# Patient Record
Sex: FEMALE | Race: BLACK OR AFRICAN AMERICAN | Employment: UNEMPLOYED | URBAN - METROPOLITAN AREA
[De-identification: names, ages, dates, MRNs, and addresses within clinical notes are randomized per-mention and may not be internally consistent; named-entity substitution may affect disease eponyms.]

---

## 2023-03-18 ENCOUNTER — HOSPITAL ENCOUNTER (EMERGENCY)
Age: 43
Discharge: HOME | End: 2023-03-18
Attending: EMERGENCY MEDICINE
Payer: MEDICAID

## 2023-03-18 ENCOUNTER — APPOINTMENT (OUTPATIENT)
Dept: CT IMAGING | Age: 43
End: 2023-03-18
Attending: EMERGENCY MEDICINE
Payer: MEDICAID

## 2023-03-18 VITALS
OXYGEN SATURATION: 100 % | SYSTOLIC BLOOD PRESSURE: 148 MMHG | RESPIRATION RATE: 18 BRPM | BODY MASS INDEX: 25.88 KG/M2 | HEIGHT: 66 IN | HEART RATE: 73 BPM | WEIGHT: 161 LBS | DIASTOLIC BLOOD PRESSURE: 91 MMHG | TEMPERATURE: 98.3 F

## 2023-03-18 DIAGNOSIS — R10.9 ACUTE ABDOMINAL PAIN: Primary | ICD-10-CM

## 2023-03-18 DIAGNOSIS — R73.9 HYPERGLYCEMIA: ICD-10-CM

## 2023-03-18 DIAGNOSIS — M54.50 LUMBAR PAIN: ICD-10-CM

## 2023-03-18 LAB
ALBUMIN SERPL-MCNC: 3.6 G/DL (ref 3.5–5)
ALBUMIN/GLOB SERPL: 0.9 (ref 1.1–2.2)
ALP SERPL-CCNC: 65 U/L (ref 45–117)
ALT SERPL-CCNC: 15 U/L (ref 12–78)
ANION GAP SERPL CALC-SCNC: 9 MMOL/L (ref 5–15)
APPEARANCE UR: CLEAR
AST SERPL-CCNC: 11 U/L (ref 15–37)
BACTERIA URNS QL MICRO: NEGATIVE /HPF
BASOPHILS # BLD: 0.1 K/UL (ref 0–0.1)
BASOPHILS NFR BLD: 1 % (ref 0–1)
BILIRUB SERPL-MCNC: 0.3 MG/DL (ref 0.2–1)
BILIRUB UR QL: NEGATIVE
BUN SERPL-MCNC: 8 MG/DL (ref 6–20)
BUN/CREAT SERPL: 12 (ref 12–20)
CALCIUM SERPL-MCNC: 8.4 MG/DL (ref 8.5–10.1)
CHLORIDE SERPL-SCNC: 101 MMOL/L (ref 97–108)
CO2 SERPL-SCNC: 27 MMOL/L (ref 21–32)
COLOR UR: ABNORMAL
CREAT SERPL-MCNC: 0.69 MG/DL (ref 0.55–1.02)
DIFFERENTIAL METHOD BLD: ABNORMAL
EOSINOPHIL # BLD: 0.2 K/UL (ref 0–0.4)
EOSINOPHIL NFR BLD: 3 % (ref 0–7)
EPITH CASTS URNS QL MICRO: ABNORMAL /LPF
ERYTHROCYTE [DISTWIDTH] IN BLOOD BY AUTOMATED COUNT: 18.7 % (ref 11.5–14.5)
GLOBULIN SER CALC-MCNC: 3.9 G/DL (ref 2–4)
GLUCOSE SERPL-MCNC: 295 MG/DL (ref 65–100)
GLUCOSE UR STRIP.AUTO-MCNC: >1000 MG/DL
HCG UR QL: NEGATIVE
HCT VFR BLD AUTO: 31.4 % (ref 35–47)
HGB BLD-MCNC: 8.7 G/DL (ref 11.5–16)
HGB UR QL STRIP: NEGATIVE
IMM GRANULOCYTES # BLD AUTO: 0 K/UL (ref 0–0.04)
IMM GRANULOCYTES NFR BLD AUTO: 0 % (ref 0–0.5)
KETONES UR QL STRIP.AUTO: NEGATIVE MG/DL
LEUKOCYTE ESTERASE UR QL STRIP.AUTO: NEGATIVE
LIPASE SERPL-CCNC: 481 U/L (ref 73–393)
LYMPHOCYTES # BLD: 1.7 K/UL (ref 0.8–3.5)
LYMPHOCYTES NFR BLD: 26 % (ref 12–49)
MCH RBC QN AUTO: 18.1 PG (ref 26–34)
MCHC RBC AUTO-ENTMCNC: 27.7 G/DL (ref 30–36.5)
MCV RBC AUTO: 65.3 FL (ref 80–99)
MONOCYTES # BLD: 0.4 K/UL (ref 0–1)
MONOCYTES NFR BLD: 6 % (ref 5–13)
NEUTS SEG # BLD: 4.1 K/UL (ref 1.8–8)
NEUTS SEG NFR BLD: 64 % (ref 32–75)
NITRITE UR QL STRIP.AUTO: NEGATIVE
NRBC # BLD: 0 K/UL (ref 0–0.01)
NRBC BLD-RTO: 0 PER 100 WBC
PH UR STRIP: 6 (ref 5–8)
PLATELET # BLD AUTO: 357 K/UL (ref 150–400)
PMV BLD AUTO: 9.8 FL (ref 8.9–12.9)
POTASSIUM SERPL-SCNC: 4.3 MMOL/L (ref 3.5–5.1)
PROT SERPL-MCNC: 7.5 G/DL (ref 6.4–8.2)
PROT UR STRIP-MCNC: NEGATIVE MG/DL
RBC # BLD AUTO: 4.81 M/UL (ref 3.8–5.2)
RBC #/AREA URNS HPF: ABNORMAL /HPF (ref 0–5)
RBC MORPH BLD: ABNORMAL
RBC MORPH BLD: ABNORMAL
SODIUM SERPL-SCNC: 137 MMOL/L (ref 136–145)
SP GR UR REFRACTOMETRY: 1.01 (ref 1–1.03)
UA: UC IF INDICATED,UAUC: ABNORMAL
UROBILINOGEN UR QL STRIP.AUTO: 0.2 EU/DL (ref 0.2–1)
WBC # BLD AUTO: 6.5 K/UL (ref 3.6–11)
WBC URNS QL MICRO: ABNORMAL /HPF (ref 0–4)

## 2023-03-18 PROCEDURE — 83690 ASSAY OF LIPASE: CPT

## 2023-03-18 PROCEDURE — 36415 COLL VENOUS BLD VENIPUNCTURE: CPT

## 2023-03-18 PROCEDURE — 74011250636 HC RX REV CODE- 250/636: Performed by: EMERGENCY MEDICINE

## 2023-03-18 PROCEDURE — 80053 COMPREHEN METABOLIC PANEL: CPT

## 2023-03-18 PROCEDURE — 81001 URINALYSIS AUTO W/SCOPE: CPT

## 2023-03-18 PROCEDURE — 96374 THER/PROPH/DIAG INJ IV PUSH: CPT

## 2023-03-18 PROCEDURE — 74177 CT ABD & PELVIS W/CONTRAST: CPT

## 2023-03-18 PROCEDURE — 74011000636 HC RX REV CODE- 636: Performed by: EMERGENCY MEDICINE

## 2023-03-18 PROCEDURE — 96375 TX/PRO/DX INJ NEW DRUG ADDON: CPT

## 2023-03-18 PROCEDURE — 81025 URINE PREGNANCY TEST: CPT

## 2023-03-18 PROCEDURE — 85025 COMPLETE CBC W/AUTO DIFF WBC: CPT

## 2023-03-18 PROCEDURE — 99285 EMERGENCY DEPT VISIT HI MDM: CPT

## 2023-03-18 RX ORDER — TRAMADOL HYDROCHLORIDE 50 MG/1
50 TABLET ORAL
Qty: 10 TABLET | Refills: 0 | Status: SHIPPED | OUTPATIENT
Start: 2023-03-18 | End: 2023-03-21

## 2023-03-18 RX ORDER — MORPHINE SULFATE 2 MG/ML
2 INJECTION, SOLUTION INTRAMUSCULAR; INTRAVENOUS
Status: COMPLETED | OUTPATIENT
Start: 2023-03-18 | End: 2023-03-18

## 2023-03-18 RX ORDER — SODIUM CHLORIDE 0.9 % (FLUSH) 0.9 %
5-40 SYRINGE (ML) INJECTION AS NEEDED
Status: DISCONTINUED | OUTPATIENT
Start: 2023-03-18 | End: 2023-03-18 | Stop reason: HOSPADM

## 2023-03-18 RX ORDER — SODIUM CHLORIDE 0.9 % (FLUSH) 0.9 %
5-40 SYRINGE (ML) INJECTION EVERY 8 HOURS
Status: DISCONTINUED | OUTPATIENT
Start: 2023-03-18 | End: 2023-03-18 | Stop reason: HOSPADM

## 2023-03-18 RX ORDER — ONDANSETRON 4 MG/1
4 TABLET, ORALLY DISINTEGRATING ORAL
Qty: 10 TABLET | Refills: 0 | Status: SHIPPED | OUTPATIENT
Start: 2023-03-18

## 2023-03-18 RX ORDER — METHOCARBAMOL 750 MG/1
750 TABLET, FILM COATED ORAL 4 TIMES DAILY
Qty: 20 TABLET | Refills: 0 | Status: SHIPPED | OUTPATIENT
Start: 2023-03-18

## 2023-03-18 RX ORDER — KETOROLAC TROMETHAMINE 30 MG/ML
30 INJECTION, SOLUTION INTRAMUSCULAR; INTRAVENOUS
Status: COMPLETED | OUTPATIENT
Start: 2023-03-18 | End: 2023-03-18

## 2023-03-18 RX ORDER — ONDANSETRON 2 MG/ML
4 INJECTION INTRAMUSCULAR; INTRAVENOUS
Status: COMPLETED | OUTPATIENT
Start: 2023-03-18 | End: 2023-03-18

## 2023-03-18 RX ADMIN — IOPAMIDOL 100 ML: 755 INJECTION, SOLUTION INTRAVENOUS at 09:28

## 2023-03-18 RX ADMIN — MORPHINE SULFATE 2 MG: 2 INJECTION, SOLUTION INTRAMUSCULAR; INTRAVENOUS at 08:54

## 2023-03-18 RX ADMIN — KETOROLAC TROMETHAMINE 30 MG: 30 INJECTION, SOLUTION INTRAMUSCULAR at 07:58

## 2023-03-18 RX ADMIN — ONDANSETRON 4 MG: 2 INJECTION INTRAMUSCULAR; INTRAVENOUS at 07:58
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
## 2023-03-18 NOTE — ED TRIAGE NOTES
Patient presents to ED with c/o abdominal and back pain that began two days ago. Patient is alert and oriented x 4 and in no acute distress at this time. Respirations are at a regular rate, depth, and pattern. Patient updated on plan of care and has no questions or concerns at this time. Call bell within reach. Will continue to monitor. Please reference nursing assessment. Emergency Department Nursing Plan of Care       The Nursing Plan of Care is developed from the Nursing assessment and Emergency Department Attending provider initial evaluation. The plan of care may be reviewed in the ED Provider note.     The Plan of Care was developed with the following considerations:   Patient / Family readiness to learn indicated by:verbalized understanding and successful return demonstration  Persons(s) to be included in education: patient  Barriers to Learning/Limitations:No    Signed     Aramis Horvath RN    3/18/2023   7:17 AM

## 2023-03-18 NOTE — Clinical Note
[facility] EMERGENCY DEPT  [address] [facility] [id number] [phone]    Work/School Note    Date: 3/18/2023    To Whom It May concern:    Joellen Willis was seen and treated today in the emergency room by the following provider(s):  Attending Provider: Tracey Vargas MD.      Joellen Willis is excused from work/school on 03/18/23 and 03/19/23. She is medically clear to return to work/school on 3/20/2023.        Sincerely,          Army Yamil MD

## 2023-05-22 ENCOUNTER — HOSPITAL ENCOUNTER (EMERGENCY)
Facility: HOSPITAL | Age: 43
Discharge: HOME | End: 2023-05-22
Payer: MEDICAID

## 2023-05-22 ENCOUNTER — APPOINTMENT (OUTPATIENT)
Facility: HOSPITAL | Age: 43
End: 2023-05-22
Payer: MEDICAID

## 2023-05-22 VITALS
SYSTOLIC BLOOD PRESSURE: 170 MMHG | TEMPERATURE: 98.7 F | OXYGEN SATURATION: 100 % | BODY MASS INDEX: 26.76 KG/M2 | HEIGHT: 66 IN | RESPIRATION RATE: 16 BRPM | HEART RATE: 68 BPM | WEIGHT: 166.5 LBS | DIASTOLIC BLOOD PRESSURE: 79 MMHG

## 2023-05-22 DIAGNOSIS — D64.9 ANEMIA, UNSPECIFIED TYPE: ICD-10-CM

## 2023-05-22 DIAGNOSIS — I10 ESSENTIAL HYPERTENSION: ICD-10-CM

## 2023-05-22 DIAGNOSIS — E11.65 TYPE 2 DIABETES MELLITUS WITH HYPERGLYCEMIA, UNSPECIFIED WHETHER LONG TERM INSULIN USE (HCC): ICD-10-CM

## 2023-05-22 DIAGNOSIS — E86.0 DEHYDRATION: Primary | ICD-10-CM

## 2023-05-22 DIAGNOSIS — R10.9 ABDOMINAL PAIN, UNSPECIFIED ABDOMINAL LOCATION: ICD-10-CM

## 2023-05-22 DIAGNOSIS — M54.50 BILATERAL LOW BACK PAIN WITHOUT SCIATICA, UNSPECIFIED CHRONICITY: ICD-10-CM

## 2023-05-22 LAB
ALBUMIN SERPL-MCNC: 3.7 G/DL (ref 3.5–5)
ALBUMIN/GLOB SERPL: 1 (ref 1.1–2.2)
ALP SERPL-CCNC: 58 U/L (ref 45–117)
ALT SERPL-CCNC: 13 U/L (ref 12–78)
ANION GAP SERPL CALC-SCNC: 10 MMOL/L (ref 5–15)
APPEARANCE UR: CLEAR
AST SERPL-CCNC: 9 U/L (ref 15–37)
BACTERIA URNS QL MICRO: NEGATIVE /HPF
BASOPHILS # BLD: 0.1 K/UL (ref 0–0.1)
BASOPHILS NFR BLD: 1 % (ref 0–1)
BILIRUB SERPL-MCNC: 0.6 MG/DL (ref 0.2–1)
BILIRUB UR QL: NEGATIVE
BUN SERPL-MCNC: 8 MG/DL (ref 6–20)
BUN/CREAT SERPL: 11 (ref 12–20)
CALCIUM SERPL-MCNC: 8.9 MG/DL (ref 8.5–10.1)
CHLORIDE SERPL-SCNC: 99 MMOL/L (ref 97–108)
CO2 SERPL-SCNC: 27 MMOL/L (ref 21–32)
COLOR UR: ABNORMAL
CREAT SERPL-MCNC: 0.72 MG/DL (ref 0.55–1.02)
DIFFERENTIAL METHOD BLD: ABNORMAL
EOSINOPHIL # BLD: 0.1 K/UL (ref 0–0.4)
EOSINOPHIL NFR BLD: 2 % (ref 0–7)
EPITH CASTS URNS QL MICRO: ABNORMAL /LPF
ERYTHROCYTE [DISTWIDTH] IN BLOOD BY AUTOMATED COUNT: 19.9 % (ref 11.5–14.5)
GLOBULIN SER CALC-MCNC: 3.8 G/DL (ref 2–4)
GLUCOSE SERPL-MCNC: 197 MG/DL (ref 65–100)
GLUCOSE UR STRIP.AUTO-MCNC: 100 MG/DL
HCG UR QL: NEGATIVE
HCT VFR BLD AUTO: 32.8 % (ref 35–47)
HGB BLD-MCNC: 9.1 G/DL (ref 11.5–16)
HGB UR QL STRIP: NEGATIVE
IMM GRANULOCYTES # BLD AUTO: 0 K/UL (ref 0–0.04)
IMM GRANULOCYTES NFR BLD AUTO: 0 % (ref 0–0.5)
KETONES UR QL STRIP.AUTO: 40 MG/DL
LEUKOCYTE ESTERASE UR QL STRIP.AUTO: NEGATIVE
LIPASE SERPL-CCNC: 232 U/L (ref 73–393)
LYMPHOCYTES # BLD: 1.8 K/UL (ref 0.8–3.5)
LYMPHOCYTES NFR BLD: 27 % (ref 12–49)
MCH RBC QN AUTO: 18.1 PG (ref 26–34)
MCHC RBC AUTO-ENTMCNC: 27.7 G/DL (ref 30–36.5)
MCV RBC AUTO: 65.1 FL (ref 80–99)
MONOCYTES # BLD: 0.5 K/UL (ref 0–1)
MONOCYTES NFR BLD: 7 % (ref 5–13)
NEUTS SEG # BLD: 4 K/UL (ref 1.8–8)
NEUTS SEG NFR BLD: 63 % (ref 32–75)
NITRITE UR QL STRIP.AUTO: NEGATIVE
NRBC # BLD: 0 K/UL (ref 0–0.01)
NRBC BLD-RTO: 0 PER 100 WBC
PH UR STRIP: 5.5 (ref 5–8)
PLATELET # BLD AUTO: 242 K/UL (ref 150–400)
POTASSIUM SERPL-SCNC: 3.9 MMOL/L (ref 3.5–5.1)
PROT SERPL-MCNC: 7.5 G/DL (ref 6.4–8.2)
PROT UR STRIP-MCNC: ABNORMAL MG/DL
RBC # BLD AUTO: 5.04 M/UL (ref 3.8–5.2)
RBC #/AREA URNS HPF: ABNORMAL /HPF (ref 0–5)
RBC MORPH BLD: ABNORMAL
SODIUM SERPL-SCNC: 136 MMOL/L (ref 136–145)
SP GR UR REFRACTOMETRY: 1.02
UROBILINOGEN UR QL STRIP.AUTO: 0.2 EU/DL (ref 0.2–1)
WBC # BLD AUTO: 6.5 K/UL (ref 3.6–11)
WBC URNS QL MICRO: ABNORMAL /HPF (ref 0–4)

## 2023-05-22 PROCEDURE — 81001 URINALYSIS AUTO W/SCOPE: CPT

## 2023-05-22 PROCEDURE — 80053 COMPREHEN METABOLIC PANEL: CPT

## 2023-05-22 PROCEDURE — 99285 EMERGENCY DEPT VISIT HI MDM: CPT

## 2023-05-22 PROCEDURE — 74177 CT ABD & PELVIS W/CONTRAST: CPT

## 2023-05-22 PROCEDURE — 81025 URINE PREGNANCY TEST: CPT

## 2023-05-22 PROCEDURE — 36415 COLL VENOUS BLD VENIPUNCTURE: CPT

## 2023-05-22 PROCEDURE — 85025 COMPLETE CBC W/AUTO DIFF WBC: CPT

## 2023-05-22 PROCEDURE — 96375 TX/PRO/DX INJ NEW DRUG ADDON: CPT

## 2023-05-22 PROCEDURE — 6360000002 HC RX W HCPCS: Performed by: PHYSICIAN ASSISTANT

## 2023-05-22 PROCEDURE — 6360000004 HC RX CONTRAST MEDICATION: Performed by: PHYSICIAN ASSISTANT

## 2023-05-22 PROCEDURE — 6370000000 HC RX 637 (ALT 250 FOR IP): Performed by: PHYSICIAN ASSISTANT

## 2023-05-22 PROCEDURE — 83690 ASSAY OF LIPASE: CPT

## 2023-05-22 PROCEDURE — 96374 THER/PROPH/DIAG INJ IV PUSH: CPT

## 2023-05-22 RX ORDER — ONDANSETRON 2 MG/ML
4 INJECTION INTRAMUSCULAR; INTRAVENOUS ONCE
Status: COMPLETED | OUTPATIENT
Start: 2023-05-22 | End: 2023-05-22

## 2023-05-22 RX ORDER — KETOROLAC TROMETHAMINE 30 MG/ML
15 INJECTION, SOLUTION INTRAMUSCULAR; INTRAVENOUS ONCE
Status: COMPLETED | OUTPATIENT
Start: 2023-05-22 | End: 2023-05-22

## 2023-05-22 RX ORDER — CYCLOBENZAPRINE HCL 10 MG
10 TABLET ORAL 3 TIMES DAILY PRN
Status: DISCONTINUED | OUTPATIENT
Start: 2023-05-22 | End: 2023-05-22 | Stop reason: HOSPADM

## 2023-05-22 RX ORDER — CYCLOBENZAPRINE HCL 10 MG
10 TABLET ORAL 3 TIMES DAILY PRN
Qty: 21 TABLET | Refills: 0 | Status: SHIPPED | OUTPATIENT
Start: 2023-05-22 | End: 2023-05-29

## 2023-05-22 RX ORDER — SENNOSIDES 8.6 MG
650 CAPSULE ORAL EVERY 8 HOURS PRN
Qty: 15 TABLET | Refills: 0 | Status: SHIPPED | OUTPATIENT
Start: 2023-05-22 | End: 2023-05-27

## 2023-05-22 RX ADMIN — IOPAMIDOL 100 ML: 755 INJECTION, SOLUTION INTRAVENOUS at 11:39

## 2023-05-22 RX ADMIN — ONDANSETRON HYDROCHLORIDE 4 MG: 2 INJECTION INTRAMUSCULAR; INTRAVENOUS at 11:12

## 2023-05-22 RX ADMIN — CYCLOBENZAPRINE 10 MG: 10 TABLET, FILM COATED ORAL at 13:22

## 2023-05-22 RX ADMIN — KETOROLAC TROMETHAMINE 15 MG: 30 INJECTION, SOLUTION INTRAMUSCULAR at 11:12

## 2023-05-22 ASSESSMENT — PAIN - FUNCTIONAL ASSESSMENT: PAIN_FUNCTIONAL_ASSESSMENT: 0-10

## 2023-05-22 ASSESSMENT — PAIN SCALES - GENERAL
PAINLEVEL_OUTOF10: 8
PAINLEVEL_OUTOF10: 8

## 2023-05-22 ASSESSMENT — PAIN DESCRIPTION - LOCATION
LOCATION: ABDOMEN
LOCATION: BACK

## 2023-05-22 ASSESSMENT — ENCOUNTER SYMPTOMS
ABDOMINAL PAIN: 1
NAUSEA: 1
DIARRHEA: 1
VOMITING: 0

## 2023-05-22 ASSESSMENT — PAIN DESCRIPTION - ORIENTATION
ORIENTATION: LOWER
ORIENTATION: LOWER

## 2023-05-22 ASSESSMENT — PAIN DESCRIPTION - DESCRIPTORS: DESCRIPTORS: CRAMPING

## 2023-05-22 NOTE — ED PROVIDER NOTES
[facility] EMERGENCY DEPT  EMERGENCY DEPARTMENT ENCOUNTER       Pt Name: Omero Saldivar  MRN: [id number]  [place] 1980  Date of evaluation: 5/22/2023  Provider: LAURENCE Sellers   PCP: Barron Zamora MD  Note Started: 4:28 PM 5/22/23     CHIEF COMPLAINT       Chief Complaint   Patient presents with    Abdominal Pain    Leg Pain               HISTORY OF PRESENT ILLNESS: 1 or more elements      History From: Patient  HPI Limitations: None     Omero Saldivar is a 36 y.o. female with PMH of DM, anemia, gallstones, HTN, depression, endometriosis, pancreatitis who presents with CC of abdominal pain for 3 days. The pain is described as a cramping sensation in her generalized upper abdomen, as well as pain in the bilateral lower abdomen. She endorses diarrhea and nausea with NBNB vomiting. She has not vomited over the last 2 days but notes this is due to decreased p.o. intake related to nausea. She also complaints of generalized back \"spasms from my neck to my lower back\" that she believes is associated with her abdominal pain. She is concerned this may be related to her prior diagnosis of endometriosis. She reports irregular menstrual cycles. She was previously on birth control injections but discontinued use just over a month ago and reports irregular bleeding since then. She denies fevers, urinary symptoms, abnormal vaginal discharge, chest pain, shortness of breath. She does not drink alcohol presently. She was reportedly seen for this same problem in this ED several weeks ago. At that time they discussed obtaining a CT scan but it was deferred, and she was advised to return to the ED if her pain returned. Nursing Notes were all reviewed and agreed with or any disagreements were addressed in the HPI. REVIEW OF SYSTEMS      Review of Systems   Gastrointestinal:  Positive for abdominal pain, diarrhea and nausea. Negative for vomiting. Positives and Pertinent negatives as per HPI.     PAST

## 2023-05-22 NOTE — ED TRIAGE NOTES
Triage Note: Patient arrives to ER complaining of abdominal pain x 2 days. States pain is a cramping feeling that is in her lower abdomen, worse on the left side. States she was seen here about 2 weeks ago for the same. Also complains of left leg pain and numbness x few months, states it is getting worse.

## 2023-05-22 NOTE — ED NOTES
Discharge instructions were given to the patient by Shani Fuentes RN  . The patient left the Emergency Department alert and oriented and in no acute distress with 2 prescription(s). The patient was encouraged to call or return to the ED for worsening issues or problems and was encouraged to schedule a follow up appointment for continuing care. The patient verbalized understanding of discharge instructions and prescriptions; all questions were answered. The patient has no further concerns at this time.          Shani Fuentes RN  05/22/23 8490
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
## 2023-10-25 ENCOUNTER — HOSPITAL ENCOUNTER (EMERGENCY)
Facility: HOSPITAL | Age: 43
Discharge: HOME | End: 2023-10-25
Payer: MEDICAID

## 2023-10-25 ENCOUNTER — APPOINTMENT (OUTPATIENT)
Facility: HOSPITAL | Age: 43
End: 2023-10-25
Payer: MEDICAID

## 2023-10-25 VITALS
RESPIRATION RATE: 18 BRPM | WEIGHT: 158.5 LBS | HEART RATE: 88 BPM | SYSTOLIC BLOOD PRESSURE: 166 MMHG | DIASTOLIC BLOOD PRESSURE: 76 MMHG | HEIGHT: 66 IN | BODY MASS INDEX: 25.47 KG/M2 | OXYGEN SATURATION: 100 % | TEMPERATURE: 98.1 F

## 2023-10-25 DIAGNOSIS — M79.605 LEFT LEG PAIN: Primary | ICD-10-CM

## 2023-10-25 PROCEDURE — 99284 EMERGENCY DEPT VISIT MOD MDM: CPT

## 2023-10-25 PROCEDURE — 6370000000 HC RX 637 (ALT 250 FOR IP)

## 2023-10-25 PROCEDURE — 96372 THER/PROPH/DIAG INJ SC/IM: CPT

## 2023-10-25 PROCEDURE — 73630 X-RAY EXAM OF FOOT: CPT

## 2023-10-25 PROCEDURE — 73610 X-RAY EXAM OF ANKLE: CPT

## 2023-10-25 PROCEDURE — 6360000002 HC RX W HCPCS

## 2023-10-25 RX ORDER — ACETAMINOPHEN 500 MG
1000 TABLET ORAL EVERY 8 HOURS PRN
Qty: 60 TABLET | Refills: 0 | Status: SHIPPED | OUTPATIENT
Start: 2023-10-25

## 2023-10-25 RX ORDER — BLOOD SUGAR DIAGNOSTIC
1 STRIP MISCELLANEOUS 2 TIMES DAILY
COMMUNITY
Start: 2022-05-25

## 2023-10-25 RX ORDER — INSULIN GLARGINE 100 [IU]/ML
40 INJECTION, SOLUTION SUBCUTANEOUS EVERY MORNING
COMMUNITY
Start: 2022-05-25

## 2023-10-25 RX ORDER — NAPROXEN 500 MG/1
500 TABLET ORAL 2 TIMES DAILY
Qty: 60 TABLET | Refills: 0 | Status: SHIPPED | OUTPATIENT
Start: 2023-10-25

## 2023-10-25 RX ORDER — LURASIDONE HYDROCHLORIDE 40 MG/1
40 TABLET, FILM COATED ORAL
COMMUNITY

## 2023-10-25 RX ORDER — ATORVASTATIN CALCIUM 40 MG/1
40 TABLET, FILM COATED ORAL DAILY
COMMUNITY
Start: 2022-05-25

## 2023-10-25 RX ORDER — KETOROLAC TROMETHAMINE 30 MG/ML
15 INJECTION, SOLUTION INTRAMUSCULAR; INTRAVENOUS
Status: DISCONTINUED | OUTPATIENT
Start: 2023-10-25 | End: 2023-10-25

## 2023-10-25 RX ORDER — KETOROLAC TROMETHAMINE 30 MG/ML
30 INJECTION, SOLUTION INTRAMUSCULAR; INTRAVENOUS ONCE
Status: COMPLETED | OUTPATIENT
Start: 2023-10-25 | End: 2023-10-25

## 2023-10-25 RX ORDER — ZOLPIDEM TARTRATE 5 MG/1
1 TABLET ORAL NIGHTLY PRN
COMMUNITY

## 2023-10-25 RX ORDER — LOSARTAN POTASSIUM 25 MG/1
25 TABLET ORAL DAILY
COMMUNITY
Start: 2022-02-25

## 2023-10-25 RX ORDER — ACETAMINOPHEN 500 MG
1000 TABLET ORAL
Status: COMPLETED | OUTPATIENT
Start: 2023-10-25 | End: 2023-10-25

## 2023-10-25 RX ADMIN — KETOROLAC TROMETHAMINE 30 MG: 30 INJECTION, SOLUTION INTRAMUSCULAR; INTRAVENOUS at 10:50

## 2023-10-25 RX ADMIN — ACETAMINOPHEN 1000 MG: 500 TABLET ORAL at 10:49

## 2023-10-25 ASSESSMENT — PAIN DESCRIPTION - ORIENTATION: ORIENTATION: LEFT

## 2023-10-25 ASSESSMENT — PAIN DESCRIPTION - LOCATION: LOCATION: LEG

## 2023-10-25 ASSESSMENT — PAIN DESCRIPTION - DESCRIPTORS: DESCRIPTORS: SHARP;SHOOTING;THROBBING

## 2023-10-25 ASSESSMENT — PAIN - FUNCTIONAL ASSESSMENT: PAIN_FUNCTIONAL_ASSESSMENT: 0-10

## 2023-10-25 NOTE — ED TRIAGE NOTES
Pt presents with left leg pain that is sharp and radiates down to her foot where she feels numbness x 2 weeks. Pt states she fell down a flight of stairs x 1 year ago and states she has done soap baths for relief.

## 2025-03-25 ENCOUNTER — HOSPITAL ENCOUNTER (EMERGENCY)
Facility: HOSPITAL | Age: 45
Discharge: HOME | End: 2025-03-25
Payer: MEDICAID

## 2025-03-25 ENCOUNTER — APPOINTMENT (OUTPATIENT)
Facility: HOSPITAL | Age: 45
End: 2025-03-25
Payer: MEDICAID

## 2025-03-25 VITALS
RESPIRATION RATE: 18 BRPM | TEMPERATURE: 98.2 F | OXYGEN SATURATION: 100 % | SYSTOLIC BLOOD PRESSURE: 117 MMHG | WEIGHT: 145.94 LBS | BODY MASS INDEX: 23.56 KG/M2 | DIASTOLIC BLOOD PRESSURE: 58 MMHG | HEART RATE: 78 BPM

## 2025-03-25 DIAGNOSIS — R06.02 SHORTNESS OF BREATH: ICD-10-CM

## 2025-03-25 DIAGNOSIS — M79.10 MYALGIA: ICD-10-CM

## 2025-03-25 DIAGNOSIS — J06.9 UPPER RESPIRATORY TRACT INFECTION, UNSPECIFIED TYPE: ICD-10-CM

## 2025-03-25 DIAGNOSIS — R42 DIZZINESS: ICD-10-CM

## 2025-03-25 DIAGNOSIS — R06.09 DYSPNEA ON EXERTION: Primary | ICD-10-CM

## 2025-03-25 LAB
ALBUMIN SERPL-MCNC: 3.8 G/DL (ref 3.5–5)
ALBUMIN/GLOB SERPL: 1.2 (ref 1.1–2.2)
ALP SERPL-CCNC: 56 U/L (ref 45–117)
ALT SERPL-CCNC: 26 U/L (ref 12–78)
ANION GAP SERPL CALC-SCNC: 8 MMOL/L (ref 2–12)
APPEARANCE UR: CLEAR
AST SERPL-CCNC: 8 U/L (ref 15–37)
BACTERIA URNS QL MICRO: NEGATIVE /HPF
BASOPHILS # BLD: 0.03 K/UL (ref 0–0.1)
BASOPHILS NFR BLD: 0.4 % (ref 0–1)
BILIRUB SERPL-MCNC: 0.8 MG/DL (ref 0.2–1)
BILIRUB UR QL: NEGATIVE
BUN SERPL-MCNC: 9 MG/DL (ref 6–20)
BUN/CREAT SERPL: 9 (ref 12–20)
CALCIUM SERPL-MCNC: 8.6 MG/DL (ref 8.5–10.1)
CHLORIDE SERPL-SCNC: 102 MMOL/L (ref 97–108)
CO2 SERPL-SCNC: 26 MMOL/L (ref 21–32)
COLOR UR: ABNORMAL
CREAT SERPL-MCNC: 0.95 MG/DL (ref 0.55–1.02)
D DIMER PPP FEU-MCNC: 0.22 MG/L FEU (ref 0–0.65)
DIFFERENTIAL METHOD BLD: ABNORMAL
EOSINOPHIL # BLD: 0.22 K/UL (ref 0–0.4)
EOSINOPHIL NFR BLD: 2.8 % (ref 0–7)
EPITH CASTS URNS QL MICRO: ABNORMAL /LPF
ERYTHROCYTE [DISTWIDTH] IN BLOOD BY AUTOMATED COUNT: 11.9 % (ref 11.5–14.5)
FLUAV RNA SPEC QL NAA+PROBE: NOT DETECTED
FLUBV RNA SPEC QL NAA+PROBE: NOT DETECTED
GLOBULIN SER CALC-MCNC: 3.3 G/DL (ref 2–4)
GLUCOSE SERPL-MCNC: 252 MG/DL (ref 65–100)
GLUCOSE UR STRIP.AUTO-MCNC: 250 MG/DL
HCG UR QL: NEGATIVE
HCT VFR BLD AUTO: 31 % (ref 35–47)
HGB BLD-MCNC: 10.7 G/DL (ref 11.5–16)
HGB UR QL STRIP: ABNORMAL
IMM GRANULOCYTES # BLD AUTO: 0.02 K/UL (ref 0–0.04)
IMM GRANULOCYTES NFR BLD AUTO: 0.3 % (ref 0–0.5)
KETONES UR QL STRIP.AUTO: ABNORMAL MG/DL
LEUKOCYTE ESTERASE UR QL STRIP.AUTO: ABNORMAL
LYMPHOCYTES # BLD: 1.99 K/UL (ref 0.8–3.5)
LYMPHOCYTES NFR BLD: 25 % (ref 12–49)
MAGNESIUM SERPL-MCNC: 1.6 MG/DL (ref 1.6–2.4)
MCH RBC QN AUTO: 30.3 PG (ref 26–34)
MCHC RBC AUTO-ENTMCNC: 34.5 G/DL (ref 30–36.5)
MCV RBC AUTO: 87.8 FL (ref 80–99)
MONOCYTES # BLD: 0.45 K/UL (ref 0–1)
MONOCYTES NFR BLD: 5.6 % (ref 5–13)
NEUTS SEG # BLD: 5.26 K/UL (ref 1.8–8)
NEUTS SEG NFR BLD: 65.9 % (ref 32–75)
NITRITE UR QL STRIP.AUTO: NEGATIVE
NRBC # BLD: 0 K/UL (ref 0–0.01)
NRBC BLD-RTO: 0 PER 100 WBC
NT PRO BNP: 75 PG/ML (ref 0–125)
PH UR STRIP: 5.5 (ref 5–8)
PLATELET # BLD AUTO: 210 K/UL (ref 150–400)
PMV BLD AUTO: 10.6 FL (ref 8.9–12.9)
POTASSIUM SERPL-SCNC: 3.8 MMOL/L (ref 3.5–5.1)
PROT SERPL-MCNC: 7.1 G/DL (ref 6.4–8.2)
PROT UR STRIP-MCNC: 30 MG/DL
RBC # BLD AUTO: 3.53 M/UL (ref 3.8–5.2)
RBC #/AREA URNS HPF: ABNORMAL /HPF (ref 0–5)
S PYO DNA THROAT QL NAA+PROBE: NOT DETECTED
SARS-COV-2 RNA RESP QL NAA+PROBE: NOT DETECTED
SODIUM SERPL-SCNC: 136 MMOL/L (ref 136–145)
SOURCE: NORMAL
SP GR UR REFRACTOMETRY: 1.02
TROPONIN I SERPL HS-MCNC: 7 NG/L (ref 0–51)
TROPONIN I SERPL HS-MCNC: 7 NG/L (ref 0–51)
URINE CULTURE IF INDICATED: ABNORMAL
UROBILINOGEN UR QL STRIP.AUTO: 1 EU/DL (ref 0.2–1)
WBC # BLD AUTO: 8 K/UL (ref 3.6–11)
WBC URNS QL MICRO: ABNORMAL /HPF (ref 0–4)

## 2025-03-25 PROCEDURE — 87636 SARSCOV2 & INF A&B AMP PRB: CPT

## 2025-03-25 PROCEDURE — 83735 ASSAY OF MAGNESIUM: CPT

## 2025-03-25 PROCEDURE — 81001 URINALYSIS AUTO W/SCOPE: CPT

## 2025-03-25 PROCEDURE — 93005 ELECTROCARDIOGRAM TRACING: CPT

## 2025-03-25 PROCEDURE — 71045 X-RAY EXAM CHEST 1 VIEW: CPT

## 2025-03-25 PROCEDURE — 6360000002 HC RX W HCPCS

## 2025-03-25 PROCEDURE — 85379 FIBRIN DEGRADATION QUANT: CPT

## 2025-03-25 PROCEDURE — 96365 THER/PROPH/DIAG IV INF INIT: CPT

## 2025-03-25 PROCEDURE — 99285 EMERGENCY DEPT VISIT HI MDM: CPT

## 2025-03-25 PROCEDURE — 84484 ASSAY OF TROPONIN QUANT: CPT

## 2025-03-25 PROCEDURE — 96375 TX/PRO/DX INJ NEW DRUG ADDON: CPT

## 2025-03-25 PROCEDURE — 83880 ASSAY OF NATRIURETIC PEPTIDE: CPT

## 2025-03-25 PROCEDURE — 87651 STREP A DNA AMP PROBE: CPT

## 2025-03-25 PROCEDURE — 2580000003 HC RX 258

## 2025-03-25 PROCEDURE — 36415 COLL VENOUS BLD VENIPUNCTURE: CPT

## 2025-03-25 PROCEDURE — 81025 URINE PREGNANCY TEST: CPT

## 2025-03-25 PROCEDURE — 80053 COMPREHEN METABOLIC PANEL: CPT

## 2025-03-25 PROCEDURE — 85025 COMPLETE CBC W/AUTO DIFF WBC: CPT

## 2025-03-25 PROCEDURE — 96361 HYDRATE IV INFUSION ADD-ON: CPT

## 2025-03-25 RX ORDER — LISINOPRIL 10 MG/1
5 TABLET ORAL DAILY
COMMUNITY

## 2025-03-25 RX ORDER — DIAZEPAM 10 MG/2ML
2.5 INJECTION, SOLUTION INTRAMUSCULAR; INTRAVENOUS ONCE
Status: COMPLETED | OUTPATIENT
Start: 2025-03-25 | End: 2025-03-25

## 2025-03-25 RX ORDER — 0.9 % SODIUM CHLORIDE 0.9 %
1000 INTRAVENOUS SOLUTION INTRAVENOUS ONCE
Status: COMPLETED | OUTPATIENT
Start: 2025-03-25 | End: 2025-03-25

## 2025-03-25 RX ORDER — DEXAMETHASONE SODIUM PHOSPHATE 10 MG/ML
10 INJECTION, SOLUTION INTRAMUSCULAR; INTRAVENOUS ONCE
Status: COMPLETED | OUTPATIENT
Start: 2025-03-25 | End: 2025-03-25

## 2025-03-25 RX ORDER — AZITHROMYCIN 250 MG/1
TABLET, FILM COATED ORAL
Qty: 6 TABLET | Refills: 0 | Status: SHIPPED | OUTPATIENT
Start: 2025-03-25 | End: 2025-04-04

## 2025-03-25 RX ORDER — MAGNESIUM SULFATE IN WATER 40 MG/ML
2000 INJECTION, SOLUTION INTRAVENOUS ONCE
Status: COMPLETED | OUTPATIENT
Start: 2025-03-25 | End: 2025-03-25

## 2025-03-25 RX ORDER — KETOROLAC TROMETHAMINE 30 MG/ML
30 INJECTION, SOLUTION INTRAMUSCULAR; INTRAVENOUS ONCE
Status: COMPLETED | OUTPATIENT
Start: 2025-03-25 | End: 2025-03-25

## 2025-03-25 RX ORDER — PREDNISONE 20 MG/1
40 TABLET ORAL DAILY
Qty: 10 TABLET | Refills: 0 | Status: SHIPPED | OUTPATIENT
Start: 2025-03-26 | End: 2025-03-31

## 2025-03-25 RX ORDER — MECLIZINE HYDROCHLORIDE 25 MG/1
25 TABLET ORAL 3 TIMES DAILY PRN
Qty: 30 TABLET | Refills: 0 | Status: SHIPPED | OUTPATIENT
Start: 2025-03-25 | End: 2025-04-04

## 2025-03-25 RX ADMIN — DEXAMETHASONE SODIUM PHOSPHATE 10 MG: 10 INJECTION, SOLUTION INTRAMUSCULAR; INTRAVENOUS at 12:58

## 2025-03-25 RX ADMIN — DIAZEPAM 2.5 MG: 5 INJECTION, SOLUTION INTRAMUSCULAR; INTRAVENOUS at 13:53

## 2025-03-25 RX ADMIN — MAGNESIUM SULFATE HEPTAHYDRATE 2000 MG: 40 INJECTION, SOLUTION INTRAVENOUS at 12:57

## 2025-03-25 RX ADMIN — KETOROLAC TROMETHAMINE 30 MG: 30 INJECTION, SOLUTION INTRAMUSCULAR at 13:53

## 2025-03-25 RX ADMIN — SODIUM CHLORIDE 1000 ML: 0.9 INJECTION, SOLUTION INTRAVENOUS at 12:55

## 2025-03-25 ASSESSMENT — ENCOUNTER SYMPTOMS
NAUSEA: 0
RHINORRHEA: 0
VOMITING: 0
ABDOMINAL PAIN: 1
SINUS PRESSURE: 0
DIARRHEA: 1
COUGH: 1
CHEST TIGHTNESS: 1
SORE THROAT: 1
WHEEZING: 0
SHORTNESS OF BREATH: 1

## 2025-03-25 ASSESSMENT — PAIN DESCRIPTION - LOCATION: LOCATION: GENERALIZED;THROAT

## 2025-03-25 ASSESSMENT — PAIN - FUNCTIONAL ASSESSMENT: PAIN_FUNCTIONAL_ASSESSMENT: 0-10

## 2025-03-25 ASSESSMENT — PAIN DESCRIPTION - PAIN TYPE: TYPE: ACUTE PAIN

## 2025-03-25 ASSESSMENT — PAIN SCALES - GENERAL: PAINLEVEL_OUTOF10: 8

## 2025-03-25 ASSESSMENT — PAIN DESCRIPTION - DESCRIPTORS: DESCRIPTORS: ACHING

## 2025-03-25 NOTE — ED NOTES
Pt presents ambulatory to the ED c/o body aches, CP, runny nose, sore throat, and L ear pain x 4 days. Pt has taken ibuprofen WNR. Pt has hx of HTN, diabetes, and high cholesterol.     Emergency Department Nursing Plan of Care       The Nursing Plan of Care is developed from the Nursing assessment and Emergency Department Attending provider initial evaluation.  The plan of care may be reviewed in the “ED Provider note”.      The Plan of Care was developed with the following considerations:  Patient / Family readiness to learn indicated by:verbalized understanding  Persons(s) to be included in education: patient  Barriers to Learning/Limitations:None      Signed     ANGÉLICA JEFFERSON RN    3/25/2025   11:26 AM

## 2025-03-25 NOTE — ED PROVIDER NOTES
[facility] EMERGENCY DEPARTMENT  EMERGENCY DEPARTMENT ENCOUNTER       Pt Name: Kajal Armendariz  MRN: [id number]  Birthdate 1980  Date of evaluation: 3/25/2025  Provider: RAFIA Max - CARO   PCP: Elan Burger MD  Note Started:  12:03 PM EDT 3/25/25     CHIEF COMPLAINT       Chief Complaint   Patient presents with    Generalized Body Aches           Cough    Nasal Congestion        HISTORY OF PRESENT ILLNESS: 1 or more elements      History From: Patient  HPI Limitations: None     Kajal Armendariz is a 44 y.o. female past medical history of PAD, PVD, hypertension, diabetes, osteomyelitis, marijuana use, who presents complaining of generalized bodyaches, cough, headaches, chest tightness, shortness of breath, left flank pain worse with coughing, intermittent palpitations, dizziness, x 5 days.  Patient reports initially had diarrhea which has resolved.  States that she feels weak and dehydrated.  She denies fever, nausea, vomiting, headache, unsteady gait, or recent sick contact.     Nursing Notes were all reviewed and agreed with or any disagreements were addressed in the HPI.     REVIEW OF SYSTEMS      Review of Systems   Constitutional:  Positive for activity change, appetite change and fatigue. Negative for chills, diaphoresis and fever.   HENT:  Positive for congestion, ear pain, postnasal drip and sore throat. Negative for rhinorrhea and sinus pressure.    Respiratory:  Positive for cough, chest tightness and shortness of breath. Negative for wheezing.    Cardiovascular:  Positive for chest pain.   Gastrointestinal:  Positive for abdominal pain and diarrhea. Negative for nausea and vomiting.   Musculoskeletal:  Positive for myalgias.   Neurological:  Positive for dizziness and headaches.        Positives and Pertinent negatives as per HPI.    PAST HISTORY     Past Medical History:  Past Medical History:   Diagnosis Date    Depression     Diabetes (HCC)     Gallstones     Hypertension

## 2025-03-25 NOTE — DISCHARGE INSTRUCTIONS
Thank you for choosing our Emergency Department for your care.  It is our privilege to care for you in your time of need.  In the next several days, you may receive a survey via email or mailed to your home about your experience with our team.  We would greatly appreciate you taking a few minutes to complete the survey, as we use this information to learn what we have done well and what we could be doing better. Thank you for trusting us with your care!    Below you will find a list of your tests from today's visit.   Labs and Radiology Studies  Recent Results (from the past 12 hours)   COVID-19 & Influenza Combo    Collection Time: 03/25/25 11:40 AM    Specimen: Nasopharyngeal   Result Value Ref Range    Source Nasopharyngeal      SARS-CoV-2, PCR Not detected NOTD      Rapid Influenza A By PCR Not detected NOTD      Rapid Influenza B By PCR Not detected NOTD     Group A Strep by PCR    Collection Time: 03/25/25 11:40 AM    Specimen: Swab; Throat   Result Value Ref Range    Strep Grp A PCR Not detected NOTD     CBC with Auto Differential    Collection Time: 03/25/25 12:06 PM   Result Value Ref Range    WBC 8.0 3.6 - 11.0 K/uL    RBC 3.53 (L) 3.80 - 5.20 M/uL    Hemoglobin 10.7 (L) 11.5 - 16.0 g/dL    Hematocrit 31.0 (L) 35.0 - 47.0 %    MCV 87.8 80.0 - 99.0 FL    MCH 30.3 26.0 - 34.0 PG    MCHC 34.5 30.0 - 36.5 g/dL    RDW 11.9 11.5 - 14.5 %    Platelets 210 150 - 400 K/uL    MPV 10.6 8.9 - 12.9 FL    Nucleated RBCs 0.0 0  WBC    nRBC 0.00 0.00 - 0.01 K/uL    Neutrophils % 65.9 32.0 - 75.0 %    Lymphocytes % 25.0 12.0 - 49.0 %    Monocytes % 5.6 5.0 - 13.0 %    Eosinophils % 2.8 0.0 - 7.0 %    Basophils % 0.4 0.0 - 1.0 %    Immature Granulocytes % 0.3 0.0 - 0.5 %    Neutrophils Absolute 5.26 1.80 - 8.00 K/UL    Lymphocytes Absolute 1.99 0.80 - 3.50 K/UL    Monocytes Absolute 0.45 0.00 - 1.00 K/UL    Eosinophils Absolute 0.22 0.00 - 0.40 K/UL    Basophils Absolute 0.03 0.00 - 0.10 K/UL    Immature

## 2025-03-26 LAB
EKG ATRIAL RATE: 75 BPM
EKG DIAGNOSIS: NORMAL
EKG P AXIS: 63 DEGREES
EKG P-R INTERVAL: 134 MS
EKG Q-T INTERVAL: 402 MS
EKG QRS DURATION: 74 MS
EKG QTC CALCULATION (BAZETT): 448 MS
EKG R AXIS: 89 DEGREES
EKG T AXIS: 63 DEGREES
EKG VENTRICULAR RATE: 75 BPM

## 2025-03-26 PROCEDURE — 93010 ELECTROCARDIOGRAM REPORT: CPT | Performed by: SPECIALIST

## 2025-04-28 ENCOUNTER — APPOINTMENT (OUTPATIENT)
Facility: HOSPITAL | Age: 45
End: 2025-04-28
Payer: MEDICAID

## 2025-04-28 ENCOUNTER — HOSPITAL ENCOUNTER (EMERGENCY)
Facility: HOSPITAL | Age: 45
Discharge: HOME | End: 2025-04-28
Payer: MEDICAID

## 2025-04-28 VITALS
OXYGEN SATURATION: 100 % | TEMPERATURE: 98.6 F | SYSTOLIC BLOOD PRESSURE: 186 MMHG | HEIGHT: 66 IN | WEIGHT: 141 LBS | HEART RATE: 77 BPM | DIASTOLIC BLOOD PRESSURE: 80 MMHG | RESPIRATION RATE: 13 BRPM | BODY MASS INDEX: 22.66 KG/M2

## 2025-04-28 DIAGNOSIS — M79.675 PAIN OF TOE OF LEFT FOOT: ICD-10-CM

## 2025-04-28 DIAGNOSIS — L97.521 CHRONIC ULCER OF TOE OF LEFT FOOT, LIMITED TO BREAKDOWN OF SKIN (HCC): Primary | ICD-10-CM

## 2025-04-28 DIAGNOSIS — I73.9 PAD (PERIPHERAL ARTERY DISEASE): ICD-10-CM

## 2025-04-28 DIAGNOSIS — I10 ESSENTIAL HYPERTENSION: ICD-10-CM

## 2025-04-28 DIAGNOSIS — E11.65 TYPE 2 DIABETES MELLITUS WITH HYPERGLYCEMIA, WITH LONG-TERM CURRENT USE OF INSULIN (HCC): ICD-10-CM

## 2025-04-28 DIAGNOSIS — Z79.4 TYPE 2 DIABETES MELLITUS WITH HYPERGLYCEMIA, WITH LONG-TERM CURRENT USE OF INSULIN (HCC): ICD-10-CM

## 2025-04-28 LAB
ALBUMIN SERPL-MCNC: 3.4 G/DL (ref 3.5–5)
ALBUMIN/GLOB SERPL: 0.8 (ref 1.1–2.2)
ALP SERPL-CCNC: 70 U/L (ref 45–117)
ALT SERPL-CCNC: 17 U/L (ref 12–78)
ANION GAP SERPL CALC-SCNC: 8 MMOL/L (ref 2–12)
AST SERPL-CCNC: 5 U/L (ref 15–37)
BASOPHILS # BLD: 0.03 K/UL (ref 0–0.1)
BASOPHILS NFR BLD: 0.6 % (ref 0–1)
BILIRUB SERPL-MCNC: 0.6 MG/DL (ref 0.2–1)
BUN SERPL-MCNC: 6 MG/DL (ref 6–20)
BUN/CREAT SERPL: 9 (ref 12–20)
CALCIUM SERPL-MCNC: 8.9 MG/DL (ref 8.5–10.1)
CHLORIDE SERPL-SCNC: 103 MMOL/L (ref 97–108)
CO2 SERPL-SCNC: 28 MMOL/L (ref 21–32)
CREAT SERPL-MCNC: 0.66 MG/DL (ref 0.55–1.02)
DIFFERENTIAL METHOD BLD: ABNORMAL
EOSINOPHIL # BLD: 0.17 K/UL (ref 0–0.4)
EOSINOPHIL NFR BLD: 3.2 % (ref 0–7)
ERYTHROCYTE [DISTWIDTH] IN BLOOD BY AUTOMATED COUNT: 13.3 % (ref 11.5–14.5)
GLOBULIN SER CALC-MCNC: 4.2 G/DL (ref 2–4)
GLUCOSE BLD STRIP.AUTO-MCNC: 300 MG/DL (ref 65–117)
GLUCOSE SERPL-MCNC: 259 MG/DL (ref 65–100)
HCT VFR BLD AUTO: 30.7 % (ref 35–47)
HGB BLD-MCNC: 9.8 G/DL (ref 11.5–16)
IMM GRANULOCYTES # BLD AUTO: 0.01 K/UL (ref 0–0.04)
IMM GRANULOCYTES NFR BLD AUTO: 0.2 % (ref 0–0.5)
LYMPHOCYTES # BLD: 1.12 K/UL (ref 0.8–3.5)
LYMPHOCYTES NFR BLD: 21.1 % (ref 12–49)
MCH RBC QN AUTO: 27.3 PG (ref 26–34)
MCHC RBC AUTO-ENTMCNC: 31.9 G/DL (ref 30–36.5)
MCV RBC AUTO: 85.5 FL (ref 80–99)
MONOCYTES # BLD: 0.35 K/UL (ref 0–1)
MONOCYTES NFR BLD: 6.6 % (ref 5–13)
NEUTS SEG # BLD: 3.64 K/UL (ref 1.8–8)
NEUTS SEG NFR BLD: 68.3 % (ref 32–75)
NRBC # BLD: 0 K/UL (ref 0–0.01)
NRBC BLD-RTO: 0 PER 100 WBC
PLATELET # BLD AUTO: 255 K/UL (ref 150–400)
PMV BLD AUTO: 11 FL (ref 8.9–12.9)
POTASSIUM SERPL-SCNC: 3.9 MMOL/L (ref 3.5–5.1)
PROT SERPL-MCNC: 7.6 G/DL (ref 6.4–8.2)
RBC # BLD AUTO: 3.59 M/UL (ref 3.8–5.2)
SERVICE CMNT-IMP: ABNORMAL
SODIUM SERPL-SCNC: 139 MMOL/L (ref 136–145)
WBC # BLD AUTO: 5.3 K/UL (ref 3.6–11)

## 2025-04-28 PROCEDURE — 73660 X-RAY EXAM OF TOE(S): CPT

## 2025-04-28 PROCEDURE — 96360 HYDRATION IV INFUSION INIT: CPT

## 2025-04-28 PROCEDURE — 2580000003 HC RX 258: Performed by: PHYSICIAN ASSISTANT

## 2025-04-28 PROCEDURE — 82962 GLUCOSE BLOOD TEST: CPT

## 2025-04-28 PROCEDURE — 99284 EMERGENCY DEPT VISIT MOD MDM: CPT

## 2025-04-28 PROCEDURE — 80053 COMPREHEN METABOLIC PANEL: CPT

## 2025-04-28 PROCEDURE — 6370000000 HC RX 637 (ALT 250 FOR IP): Performed by: PHYSICIAN ASSISTANT

## 2025-04-28 PROCEDURE — 85025 COMPLETE CBC W/AUTO DIFF WBC: CPT

## 2025-04-28 RX ORDER — SULFAMETHOXAZOLE AND TRIMETHOPRIM 800; 160 MG/1; MG/1
1 TABLET ORAL 2 TIMES DAILY
Qty: 20 TABLET | Refills: 0 | Status: SHIPPED | OUTPATIENT
Start: 2025-04-28 | End: 2025-05-08

## 2025-04-28 RX ORDER — 0.9 % SODIUM CHLORIDE 0.9 %
1000 INTRAVENOUS SOLUTION INTRAVENOUS ONCE
Status: COMPLETED | OUTPATIENT
Start: 2025-04-28 | End: 2025-04-28

## 2025-04-28 RX ORDER — OXYCODONE HYDROCHLORIDE 5 MG/1
5 TABLET ORAL
Refills: 0 | Status: COMPLETED | OUTPATIENT
Start: 2025-04-28 | End: 2025-04-28

## 2025-04-28 RX ADMIN — OXYCODONE 5 MG: 5 TABLET ORAL at 15:03

## 2025-04-28 RX ADMIN — SODIUM CHLORIDE 1000 ML: 0.9 INJECTION, SOLUTION INTRAVENOUS at 16:43

## 2025-04-28 ASSESSMENT — ENCOUNTER SYMPTOMS
RHINORRHEA: 0
COUGH: 0
DIARRHEA: 0
BACK PAIN: 0
WHEEZING: 0
CHEST TIGHTNESS: 0
SORE THROAT: 0
EYE PAIN: 0
VOMITING: 0
ABDOMINAL PAIN: 0
NAUSEA: 0
SHORTNESS OF BREATH: 0

## 2025-04-28 ASSESSMENT — PAIN DESCRIPTION - ORIENTATION: ORIENTATION: LEFT

## 2025-04-28 ASSESSMENT — PAIN SCALES - GENERAL: PAINLEVEL_OUTOF10: 9

## 2025-04-28 ASSESSMENT — PAIN DESCRIPTION - DESCRIPTORS: DESCRIPTORS: THROBBING;ACHING

## 2025-04-28 ASSESSMENT — PAIN - FUNCTIONAL ASSESSMENT: PAIN_FUNCTIONAL_ASSESSMENT: 0-10

## 2025-04-28 ASSESSMENT — PAIN DESCRIPTION - LOCATION: LOCATION: TOE (COMMENT WHICH ONE)

## 2025-04-28 NOTE — ED NOTES
Patient has been instructed that they have been given Oxycodone which contains opioids, benzodiazepines, or other sedating drugs. Patient is aware that they will need to refrain from driving or operating heavy machinery after taking this medication. Patient also instructed that they need to avoid drinking alcohol and using other products containing opioids, benzodiazepines, or other sedating drugs. Patient verbalized understanding.

## 2025-04-28 NOTE — ED TRIAGE NOTES
Pt presents with left great toe pain and swelling with a circular wound x 3 days. Pt has a hx of diabetes, and that she had a bypass surgery on her left leg in January, 2024, which is when the wound began. Pt has been cleaning the site with iodine, saline, and soap and water, but endorses a pungent odor emitting from the wound with yellow and green streaks.

## 2025-04-28 NOTE — ED PROVIDER NOTES
3:09 PM   Result Value Ref Range    WBC 5.3 3.6 - 11.0 K/uL    RBC 3.59 (L) 3.80 - 5.20 M/uL    Hemoglobin 9.8 (L) 11.5 - 16.0 g/dL    Hematocrit 30.7 (L) 35.0 - 47.0 %    MCV 85.5 80.0 - 99.0 FL    MCH 27.3 26.0 - 34.0 PG    MCHC 31.9 30.0 - 36.5 g/dL    RDW 13.3 11.5 - 14.5 %    Platelets 255 150 - 400 K/uL    MPV 11.0 8.9 - 12.9 FL    Nucleated RBCs 0.0 0  WBC    nRBC 0.00 0.00 - 0.01 K/uL    Neutrophils % 68.3 32.0 - 75.0 %    Lymphocytes % 21.1 12.0 - 49.0 %    Monocytes % 6.6 5.0 - 13.0 %    Eosinophils % 3.2 0.0 - 7.0 %    Basophils % 0.6 0.0 - 1.0 %    Immature Granulocytes % 0.2 0.0 - 0.5 %    Neutrophils Absolute 3.64 1.80 - 8.00 K/UL    Lymphocytes Absolute 1.12 0.80 - 3.50 K/UL    Monocytes Absolute 0.35 0.00 - 1.00 K/UL    Eosinophils Absolute 0.17 0.00 - 0.40 K/UL    Basophils Absolute 0.03 0.00 - 0.10 K/UL    Immature Granulocytes Absolute 0.01 0.00 - 0.04 K/UL    Differential Type AUTOMATED     Comprehensive Metabolic Panel    Collection Time: 04/28/25  3:09 PM   Result Value Ref Range    Sodium 139 136 - 145 mmol/L    Potassium 3.9 3.5 - 5.1 mmol/L    Chloride 103 97 - 108 mmol/L    CO2 28 21 - 32 mmol/L    Anion Gap 8 2 - 12 mmol/L    Glucose 259 (H) 65 - 100 mg/dL    BUN 6 6 - 20 MG/DL    Creatinine 0.66 0.55 - 1.02 MG/DL    BUN/Creatinine Ratio 9 (L) 12 - 20      Est, Glom Filt Rate >90 >60 ml/min/1.73m2    Calcium 8.9 8.5 - 10.1 MG/DL    Total Bilirubin 0.6 0.2 - 1.0 MG/DL    ALT 17 12 - 78 U/L    AST 5 (L) 15 - 37 U/L    Alk Phosphatase 70 45 - 117 U/L    Total Protein 7.6 6.4 - 8.2 g/dL    Albumin 3.4 (L) 3.5 - 5.0 g/dL    Globulin 4.2 (H) 2.0 - 4.0 g/dL    Albumin/Globulin Ratio 0.8 (L) 1.1 - 2.2        Abnormal Results Independent Interpretation: I reviewed and interpreted the labs including hyperglycemia:     RADS:  XR TOE LEFT (MIN 2 VIEWS)   Final Result   No radiographic evidence of acute osteomyelitis..         Electronically signed by Enrique Murillo         Non-plain film images

## 2025-04-28 NOTE — ED NOTES
Refer to triage note. Pt is alert and oriented x 4, RR even and unlabored, skin is warm and dry. Pt appears in NAD at this time. Assessment completed and pt updated on plan of care.  Call bell in reach.    Emergency Department Nursing Plan of Care  The Nursing Plan of Care is developed from the Nursing assessment and Emergency Department Attending provider initial evaluation.  The plan of care may be reviewed in the “ED Provider note”.      The Plan of Care was developed with the following considerations:  Patient / Family readiness to learn indicated by:Refer to Medical chart in [facility]  Persons(s) to be included in education: Refer to Medical chart in [facility]  Barriers to Learning/Limitations:Normal     Signed    Eileen Santiago, RN  4/28/2025

## 2025-04-28 NOTE — ED NOTES
Discharge instructions were given to the patient by VANESSA Arellano.     The patient left the Emergency Department alert and oriented and in no acute distress with 2 prescriptions. The patient was encouraged to call or return to the ED for worsening issues or problems and was encouraged to schedule a follow up appointment for continuing care.     Ambulation assessment completed before discharge.  Pt left Emergency Department ambulating at baseline with no ortho devices  Ortho device education: none    The patient verbalized understanding of discharge instructions and prescriptions, all questions were answered. The patient has no further concerns at this time.